# Patient Record
Sex: FEMALE | Race: OTHER | HISPANIC OR LATINO
[De-identification: names, ages, dates, MRNs, and addresses within clinical notes are randomized per-mention and may not be internally consistent; named-entity substitution may affect disease eponyms.]

---

## 2018-08-21 ENCOUNTER — RESULT REVIEW (OUTPATIENT)
Age: 73
End: 2018-08-21

## 2019-07-27 ENCOUNTER — RESULT REVIEW (OUTPATIENT)
Age: 74
End: 2019-07-27

## 2019-08-03 ENCOUNTER — EMERGENCY (EMERGENCY)
Facility: HOSPITAL | Age: 74
LOS: 1 days | Discharge: HOME | End: 2019-08-03
Attending: EMERGENCY MEDICINE
Payer: MEDICAID

## 2019-08-03 VITALS
RESPIRATION RATE: 20 BRPM | SYSTOLIC BLOOD PRESSURE: 137 MMHG | OXYGEN SATURATION: 98 % | HEART RATE: 120 BPM | DIASTOLIC BLOOD PRESSURE: 90 MMHG | TEMPERATURE: 98 F | WEIGHT: 132.06 LBS

## 2019-08-03 VITALS — HEART RATE: 101 BPM | DIASTOLIC BLOOD PRESSURE: 77 MMHG | OXYGEN SATURATION: 99 % | SYSTOLIC BLOOD PRESSURE: 138 MMHG

## 2019-08-03 DIAGNOSIS — Z96.1 PRESENCE OF INTRAOCULAR LENS: Chronic | ICD-10-CM

## 2019-08-03 LAB
ALBUMIN SERPL ELPH-MCNC: 4.4 G/DL — SIGNIFICANT CHANGE UP (ref 3.3–5.2)
ALP SERPL-CCNC: 173 U/L — HIGH (ref 40–120)
ALT FLD-CCNC: 20 U/L — SIGNIFICANT CHANGE UP
ANION GAP SERPL CALC-SCNC: 11 MMOL/L — SIGNIFICANT CHANGE UP (ref 5–17)
APTT BLD: 31.2 SEC — SIGNIFICANT CHANGE UP (ref 27.5–36.3)
AST SERPL-CCNC: 22 U/L — SIGNIFICANT CHANGE UP
BASOPHILS # BLD AUTO: 0.03 K/UL — SIGNIFICANT CHANGE UP (ref 0–0.2)
BASOPHILS NFR BLD AUTO: 0.4 % — SIGNIFICANT CHANGE UP (ref 0–2)
BILIRUB SERPL-MCNC: 0.6 MG/DL — SIGNIFICANT CHANGE UP (ref 0.4–2)
BUN SERPL-MCNC: 10 MG/DL — SIGNIFICANT CHANGE UP (ref 8–20)
CALCIUM SERPL-MCNC: 9.6 MG/DL — SIGNIFICANT CHANGE UP (ref 8.6–10.2)
CHLORIDE SERPL-SCNC: 99 MMOL/L — SIGNIFICANT CHANGE UP (ref 98–107)
CK SERPL-CCNC: 108 U/L — SIGNIFICANT CHANGE UP (ref 25–170)
CO2 SERPL-SCNC: 27 MMOL/L — SIGNIFICANT CHANGE UP (ref 22–29)
CREAT SERPL-MCNC: 0.49 MG/DL — LOW (ref 0.5–1.3)
EOSINOPHIL # BLD AUTO: 0.04 K/UL — SIGNIFICANT CHANGE UP (ref 0–0.5)
EOSINOPHIL NFR BLD AUTO: 0.5 % — SIGNIFICANT CHANGE UP (ref 0–6)
GLUCOSE SERPL-MCNC: 122 MG/DL — HIGH (ref 70–115)
HCT VFR BLD CALC: 41.9 % — SIGNIFICANT CHANGE UP (ref 34.5–45)
HGB BLD-MCNC: 13.6 G/DL — SIGNIFICANT CHANGE UP (ref 11.5–15.5)
IMM GRANULOCYTES NFR BLD AUTO: 0.3 % — SIGNIFICANT CHANGE UP (ref 0–1.5)
INR BLD: 1.03 RATIO — SIGNIFICANT CHANGE UP (ref 0.88–1.16)
LYMPHOCYTES # BLD AUTO: 0.98 K/UL — LOW (ref 1–3.3)
LYMPHOCYTES # BLD AUTO: 13.4 % — SIGNIFICANT CHANGE UP (ref 13–44)
MCHC RBC-ENTMCNC: 26.6 PG — LOW (ref 27–34)
MCHC RBC-ENTMCNC: 32.5 GM/DL — SIGNIFICANT CHANGE UP (ref 32–36)
MCV RBC AUTO: 82 FL — SIGNIFICANT CHANGE UP (ref 80–100)
MONOCYTES # BLD AUTO: 0.48 K/UL — SIGNIFICANT CHANGE UP (ref 0–0.9)
MONOCYTES NFR BLD AUTO: 6.6 % — SIGNIFICANT CHANGE UP (ref 2–14)
NEUTROPHILS # BLD AUTO: 5.76 K/UL — SIGNIFICANT CHANGE UP (ref 1.8–7.4)
NEUTROPHILS NFR BLD AUTO: 78.8 % — HIGH (ref 43–77)
NT-PROBNP SERPL-SCNC: 110 PG/ML — SIGNIFICANT CHANGE UP (ref 0–300)
PLATELET # BLD AUTO: 342 K/UL — SIGNIFICANT CHANGE UP (ref 150–400)
POTASSIUM SERPL-MCNC: 3.3 MMOL/L — LOW (ref 3.5–5.3)
POTASSIUM SERPL-SCNC: 3.3 MMOL/L — LOW (ref 3.5–5.3)
PROT SERPL-MCNC: 7.2 G/DL — SIGNIFICANT CHANGE UP (ref 6.6–8.7)
PROTHROM AB SERPL-ACNC: 11.8 SEC — SIGNIFICANT CHANGE UP (ref 10–12.9)
RBC # BLD: 5.11 M/UL — SIGNIFICANT CHANGE UP (ref 3.8–5.2)
RBC # FLD: 14 % — SIGNIFICANT CHANGE UP (ref 10.3–14.5)
SODIUM SERPL-SCNC: 137 MMOL/L — SIGNIFICANT CHANGE UP (ref 135–145)
TROPONIN T SERPL-MCNC: <0.01 NG/ML — SIGNIFICANT CHANGE UP (ref 0–0.06)
TROPONIN T SERPL-MCNC: <0.01 NG/ML — SIGNIFICANT CHANGE UP (ref 0–0.06)
WBC # BLD: 7.31 K/UL — SIGNIFICANT CHANGE UP (ref 3.8–10.5)
WBC # FLD AUTO: 7.31 K/UL — SIGNIFICANT CHANGE UP (ref 3.8–10.5)

## 2019-08-03 PROCEDURE — 93005 ELECTROCARDIOGRAM TRACING: CPT

## 2019-08-03 PROCEDURE — 82550 ASSAY OF CK (CPK): CPT

## 2019-08-03 PROCEDURE — 70450 CT HEAD/BRAIN W/O DYE: CPT | Mod: 26

## 2019-08-03 PROCEDURE — 70551 MRI BRAIN STEM W/O DYE: CPT

## 2019-08-03 PROCEDURE — 70551 MRI BRAIN STEM W/O DYE: CPT | Mod: 26

## 2019-08-03 PROCEDURE — 80053 COMPREHEN METABOLIC PANEL: CPT

## 2019-08-03 PROCEDURE — 85027 COMPLETE CBC AUTOMATED: CPT

## 2019-08-03 PROCEDURE — 83880 ASSAY OF NATRIURETIC PEPTIDE: CPT

## 2019-08-03 PROCEDURE — 71046 X-RAY EXAM CHEST 2 VIEWS: CPT | Mod: 26

## 2019-08-03 PROCEDURE — 96374 THER/PROPH/DIAG INJ IV PUSH: CPT

## 2019-08-03 PROCEDURE — 71046 X-RAY EXAM CHEST 2 VIEWS: CPT

## 2019-08-03 PROCEDURE — 85730 THROMBOPLASTIN TIME PARTIAL: CPT

## 2019-08-03 PROCEDURE — 70544 MR ANGIOGRAPHY HEAD W/O DYE: CPT

## 2019-08-03 PROCEDURE — T1013: CPT

## 2019-08-03 PROCEDURE — 70450 CT HEAD/BRAIN W/O DYE: CPT

## 2019-08-03 PROCEDURE — G0378: CPT

## 2019-08-03 PROCEDURE — 84484 ASSAY OF TROPONIN QUANT: CPT

## 2019-08-03 PROCEDURE — 85610 PROTHROMBIN TIME: CPT

## 2019-08-03 PROCEDURE — 70547 MR ANGIOGRAPHY NECK W/O DYE: CPT | Mod: 26

## 2019-08-03 PROCEDURE — 99284 EMERGENCY DEPT VISIT MOD MDM: CPT | Mod: 25

## 2019-08-03 PROCEDURE — 36415 COLL VENOUS BLD VENIPUNCTURE: CPT

## 2019-08-03 PROCEDURE — 93010 ELECTROCARDIOGRAM REPORT: CPT | Mod: 76

## 2019-08-03 PROCEDURE — 70544 MR ANGIOGRAPHY HEAD W/O DYE: CPT | Mod: 26,59

## 2019-08-03 PROCEDURE — 99218: CPT

## 2019-08-03 PROCEDURE — 70547 MR ANGIOGRAPHY NECK W/O DYE: CPT

## 2019-08-03 RX ORDER — AMLODIPINE BESYLATE 2.5 MG/1
5 TABLET ORAL DAILY
Refills: 0 | Status: DISCONTINUED | OUTPATIENT
Start: 2019-08-03 | End: 2019-08-09

## 2019-08-03 RX ORDER — CAPTOPRIL 12.5 MG/1
25 TABLET ORAL AT BEDTIME
Refills: 0 | Status: DISCONTINUED | OUTPATIENT
Start: 2019-08-03 | End: 2019-08-09

## 2019-08-03 RX ORDER — OMEPRAZOLE 10 MG/1
1 CAPSULE, DELAYED RELEASE ORAL
Qty: 0 | Refills: 0 | DISCHARGE

## 2019-08-03 RX ORDER — PANTOPRAZOLE SODIUM 20 MG/1
40 TABLET, DELAYED RELEASE ORAL
Refills: 0 | Status: DISCONTINUED | OUTPATIENT
Start: 2019-08-03 | End: 2019-08-09

## 2019-08-03 RX ORDER — POTASSIUM CHLORIDE 20 MEQ
40 PACKET (EA) ORAL ONCE
Refills: 0 | Status: COMPLETED | OUTPATIENT
Start: 2019-08-03 | End: 2019-08-03

## 2019-08-03 RX ORDER — CAPTOPRIL 12.5 MG/1
1 TABLET ORAL
Qty: 0 | Refills: 0 | DISCHARGE

## 2019-08-03 RX ORDER — ASPIRIN/CALCIUM CARB/MAGNESIUM 324 MG
81 TABLET ORAL DAILY
Refills: 0 | Status: DISCONTINUED | OUTPATIENT
Start: 2019-08-03 | End: 2019-08-09

## 2019-08-03 RX ORDER — MECLIZINE HCL 12.5 MG
25 TABLET ORAL ONCE
Refills: 0 | Status: COMPLETED | OUTPATIENT
Start: 2019-08-03 | End: 2019-08-03

## 2019-08-03 RX ORDER — SODIUM CHLORIDE 9 MG/ML
2000 INJECTION INTRAMUSCULAR; INTRAVENOUS; SUBCUTANEOUS ONCE
Refills: 0 | Status: COMPLETED | OUTPATIENT
Start: 2019-08-03 | End: 2019-08-03

## 2019-08-03 RX ORDER — AMLODIPINE BESYLATE 2.5 MG/1
1 TABLET ORAL
Qty: 0 | Refills: 0 | DISCHARGE

## 2019-08-03 RX ORDER — ONDANSETRON 8 MG/1
4 TABLET, FILM COATED ORAL ONCE
Refills: 0 | Status: COMPLETED | OUTPATIENT
Start: 2019-08-03 | End: 2019-08-03

## 2019-08-03 RX ORDER — ASPIRIN/CALCIUM CARB/MAGNESIUM 324 MG
1 TABLET ORAL
Qty: 0 | Refills: 0 | DISCHARGE

## 2019-08-03 RX ORDER — HYDROCHLOROTHIAZIDE 25 MG
25 TABLET ORAL DAILY
Refills: 0 | Status: DISCONTINUED | OUTPATIENT
Start: 2019-08-03 | End: 2019-08-09

## 2019-08-03 RX ADMIN — Medication 25 MILLIGRAM(S): at 16:15

## 2019-08-03 RX ADMIN — PANTOPRAZOLE SODIUM 40 MILLIGRAM(S): 20 TABLET, DELAYED RELEASE ORAL at 16:15

## 2019-08-03 RX ADMIN — Medication 40 MILLIEQUIVALENT(S): at 17:46

## 2019-08-03 RX ADMIN — SODIUM CHLORIDE 1000 MILLILITER(S): 9 INJECTION INTRAMUSCULAR; INTRAVENOUS; SUBCUTANEOUS at 18:42

## 2019-08-03 RX ADMIN — Medication 81 MILLIGRAM(S): at 16:15

## 2019-08-03 RX ADMIN — AMLODIPINE BESYLATE 5 MILLIGRAM(S): 2.5 TABLET ORAL at 16:15

## 2019-08-03 RX ADMIN — Medication 25 MILLIGRAM(S): at 12:14

## 2019-08-03 RX ADMIN — ONDANSETRON 4 MILLIGRAM(S): 8 TABLET, FILM COATED ORAL at 10:56

## 2019-08-03 NOTE — ED ADULT NURSE REASSESSMENT NOTE - NS ED NURSE REASSESS COMMENT FT1
received pt from previous Rn Manuela Akins. pt predominately [place]ian speaking. ED  offered, family decline. pt c/o in-going palpations. pt states" I fewel my heart racing." pt HR-80 on cardiac monitor. DANIEL Westbrook made aware and per PA, pt cleared by cardiology  and will be dc'd today. family updated on plan of care. pt transported off unit to x-ray. resting in stretcher, no apparent distress noted. bed in lowest position, and  safety maintained.

## 2019-08-03 NOTE — ED CDU PROVIDER INITIAL DAY NOTE - ATTENDING CONTRIBUTION TO CARE
I was available for the PA.  Patient placed in observation for dizzines and found to be tachycardic.  consultation appreciated.  will monitor and continue to treat while obtaining further diagnostic testing and treatment.  Uneventful ED observation period. Non toxic.  Well appearing.

## 2019-08-03 NOTE — ED PROVIDER NOTE - OBJECTIVE STATEMENT
73 y/o F with PMHx of HTN comes to [facility] ED c/o of Dizziness and sharp CP in the AM. For her dizziness she reports having it since yesterday after waking up. She feels the room spinning after she starts moving and looking down. Reports mild decrease of hearing on left side. Associated nausea, mild epigastric burning, but no vomits.  Also reported having for the past month increasing exhaustion when walking with occasional associated SOB and CP progressing for about a month. CP is described as multiple sharp stabbings lasting a few seconds to minutes over left thoracic wall, increased with movement and relieved with local massage. Sometimes feels random palpitations and has mild swelling in feet when she misses her HCTZ dose.    Denies having tinnitus, fever, chills, malaise, sneezing, coughing, mucous in nares, sore throat, falls or previous episodes.

## 2019-08-03 NOTE — ED CDU PROVIDER INITIAL DAY NOTE - NS ED ROS FT
General: no fever/chills  HEENT: no difficulty swallowing  Respiratory: no shortness of breath or cough  Cardiac: no chest pain or palpitations  Abdomen: no abdominal pain or vomiting  Musculoskeletal: no neck pain or back pain  Neuro: + dizziness  Skin: no lesions or rashes

## 2019-08-03 NOTE — ED CDU PROVIDER INITIAL DAY NOTE - MEDICAL DECISION MAKING DETAILS
will place in observation for MRI brain, MRA head/neck, carotid US will place in observation for MRI brain, MRA head/neck, carotid US   pt found to have HR spiking into 120s with PVCs - TTE, bnp, trops, SS cardiology consult

## 2019-08-03 NOTE — ED CDU PROVIDER DISPOSITION NOTE - CLINICAL COURSE
pt c/o dizziness - head CT, MRI head, MRA head/neck, labs unremarkable - f/u with neuro as outpatient stroke workup negative - pt's HR intermittently in 120s with PVCs - TTE and SS cardiology consult stroke workup negative - pt's HR intermittently in 120s with PVCs, resolved after 2L NS. Pt evaluated by Dr. Alcantara, Cardiology. Stable for dc at this time. Pt encouraged to f/u with cardiology within 2 weeks.

## 2019-08-03 NOTE — ED CDU PROVIDER INITIAL DAY NOTE - OBJECTIVE STATEMENT
75 y/o F c/o dizziness onset this morning.  Patient is having difficulty maintaining balance while walking.

## 2019-08-03 NOTE — ED ADULT NURSE REASSESSMENT NOTE - NSIMPLEMENTINTERV_GEN_ALL_ED
Implemented All Fall Risk Interventions:  [place] to call system. Call bell, personal items and telephone within reach. Instruct patient to call for assistance. Room bathroom lighting operational. Non-slip footwear when patient is off stretcher. Physically safe environment: no spills, clutter or unnecessary equipment. Stretcher in lowest position, wheels locked, appropriate side rails in place. Provide visual cue, wrist band, yellow gown, etc. Monitor gait and stability. Monitor for mental status changes and reorient to person, place, and time. Review medications for side effects contributing to fall risk. Reinforce activity limits and safety measures with patient and family.

## 2019-08-03 NOTE — ED PROVIDER NOTE - CLINICAL SUMMARY MEDICAL DECISION MAKING FREE TEXT BOX
74 year old c/o dizziness noted to have unsteady gait.  CT negative patient placed on observation rule out cerebellar CVA.

## 2019-08-03 NOTE — ED STATDOCS - NS_ ATTENDINGSCRIBEDETAILS _ED_A_ED_FT
I, Bridgette Baer, performed the initial face to face bedside interview with this patient regarding history of present illness, review of symptoms and relevant past medical, social and family history.  I completed an independent physical examination.  The history, relevant review of systems, past medical and surgical history, medical decision making, and physical examination was documented by the scribe in my presence and I attest to the accuracy of the documentation.

## 2019-08-03 NOTE — ED PROVIDER NOTE - ATTENDING CONTRIBUTION TO CARE
74 year old hx of HTN c/o dizziness with unsteady gait.  Patient well appearing CN 2-12 intact however patient noted to have unsteady gait in the ER with persistent dizziness.  Vitals stable. CT negative for acute pathology.  Patient placed on observation for MRI to rule out cerebellar lesion vs infarct.

## 2019-08-03 NOTE — ED PROVIDER NOTE - PROGRESS NOTE DETAILS
Dr. Keys:  Patient reporting minimal improvement after meclizine patient noted to have unsteady gait.

## 2019-08-03 NOTE — ED ADULT NURSE NOTE - OBJECTIVE STATEMENT
Pt care assumed at 1045, presents to ED A&Ox3 c/o headache and dizziness with associated nausea since yesterday, states worse with position change. Pt denies any vision changes, weakness, facial droop or slurred speech. Pt also reports some pleuritic chest pain, worse when she ambulates and deep inspiration. Denies any SOB, respirations even and unlabored. Pt sinus tach on cardiac monitor, . MD at bedside and aware. Pt denies any fever, chills or sick contacts. Moving all extremities with strength and purpose. IV access established, labs drawn and sent, results pending. Will continue to monitor and reassess.

## 2019-08-03 NOTE — ED ADULT TRIAGE NOTE - CHIEF COMPLAINT QUOTE
im feeling dizzy and Ha since yesterday, reports blurry vision, reports inability to walk with steady gait, reports hx HTN,  HA worsens with movement

## 2019-08-03 NOTE — ED ADULT NURSE NOTE - NSIMPLEMENTINTERV_GEN_ALL_ED
Implemented All Universal Safety Interventions:  [place] to call system. Call bell, personal items and telephone within reach. Instruct patient to call for assistance. Room bathroom lighting operational. Non-slip footwear when patient is off stretcher. Physically safe environment: no spills, clutter or unnecessary equipment. Stretcher in lowest position, wheels locked, appropriate side rails in place.

## 2019-08-03 NOTE — ED CDU PROVIDER DISPOSITION NOTE - ATTENDING CONTRIBUTION TO CARE
here for dizziness. Work up neg. had episode tachycardia that resolved with iv fluifds  agree with care  I, Mary Palmer, performed the initial face to face bedside interview with this patient regarding history of present illness, review of symptoms and relevant past medical, social and family history.  I completed an independent physical examination.  I was the initial provider who evaluated this patient. I have signed out the follow up of any pending tests (i.e. labs, radiological studies) to the ACP.  I have communicated the patient’s plan of care and disposition with the ACP.

## 2019-08-03 NOTE — ED PROVIDER NOTE - PHYSICAL EXAMINATION
Const: Awake, alert and oriented x3. Uncomfortable, dizzy appearance. Sitting in bed and holding to rails.   HEENT: NC/AT, PERRLA, EOMI, lateral nystagmus elicited on eye movements, more prominent on right. Left tympanic membrane is opaque with evidence of clear liquid/bubbles behing left TM. Right is normal. Non tender sinuses. Clear nares, pink turbinates. Moist mucous membranes, mild erythema in oropharynx 1/4. No lesions, secretions.   Neck:. Soft and supple. Full ROM without pain. Hallpike testing elicits dizziness.   Cardiovascular: Regular rate and regular rhythm. +S1/S2. No murmurs. Peripheral pulses 2+ and symmetric. No LE edema.  Respiratory: Speaking in full sentences. No evidence of respiratory distress. CTAB. No wheezes, crackles, rales or rhonchi.  Abd: Normal bowel sounds in all 4 quadrants, Soft, non-distended. non-tender. No guarding or rebound. Negative Saeed, McBurney, Rovsing.   Neuro: Awake, alert & oriented x 3, CN II-XII grossly intact, Strength 5/5 in all extremities, Sensation preserved in all extremities. FTN & Heel to shin WNL. Romberg positive with leaning forward when eyes closed with outstretching arms. Widened gait when walking.

## 2019-08-03 NOTE — ED PROVIDER NOTE - NS ED ROS FT
Const: Denies fever, chills, malaise, fatigue, night sweats  HEENT: Has , mild decrease in left hearing. Denies changes in vision, sore throat, cough, sneezing, mucous secretions  Neck: Denies neck pain/stiffness  Resp: Denies coughing, sneezing, SOB  Cardiovascular: Has occasional CP, palpitations, LE edema  GI: Denies nausea, vomiting, abdominal pain, diarrhea, constipation, blood in stool  : Denies urinary frequency/urgency/dysuria, hematuria  MSK: Denies back pain  Neuro: Has dizziness increased on movement Denies HA, numbness, focal weaknesses, LOC  Skin: Denies rashes

## 2019-08-03 NOTE — ED CDU PROVIDER INITIAL DAY NOTE - PROGRESS NOTE DETAILS
pt still lightheaded - tachy into 120s on monitor with PVCs discussed case with Dr. Alcantara who advised to give 2 L bolus and reassess

## 2019-08-03 NOTE — ED STATDOCS - PROGRESS NOTE DETAILS
75 y/o F pt with no significant PMHx presents to the ED c/o CP and dizziness onset yesterday. Associated sx of nausea and tachycardia. She reports that she has been feeling dizzy since last night, and woke up this morning still dizzy. Dizziness lasted more than 12 hours.   CN II-XII intact, 5/5 global strength, sensation intact, (+) LUE ataxia. no dysmetria, gait intact.   LBBB at 115 BPM. No sgarbossa's criteria.   Focused eval, protocol orders entered. Pt to be moved to main ED for complete evaluation by another provider. 75 y/o F pt with no significant PMHx presents to the ED c/o CP and dizziness onset yesterday. Associated sx of nausea and tachycardia. She reports that she has been feeling dizzy since last night, and woke up this morning still dizzy. Dizziness lasted more than 12 hours.   CN II-XII intact, 5/5 global strength, sensation intact, (+) LUE ataxia. no dysmetria, gait intact.   LBBB at 115 BPM. No sgarbossa's criteria. no previous EKG in system  Focused eval, protocol orders entered. Pt to be moved to main ED for complete evaluation by another provider.

## 2020-11-28 ENCOUNTER — RESULT REVIEW (OUTPATIENT)
Age: 75
End: 2020-11-28

## 2020-12-01 ENCOUNTER — OUTPATIENT (OUTPATIENT)
Dept: OUTPATIENT SERVICES | Facility: HOSPITAL | Age: 75
LOS: 1 days | End: 2020-12-01
Payer: MEDICARE

## 2020-12-01 DIAGNOSIS — Z11.59 ENCOUNTER FOR SCREENING FOR OTHER VIRAL DISEASES: ICD-10-CM

## 2020-12-01 DIAGNOSIS — Z96.1 PRESENCE OF INTRAOCULAR LENS: Chronic | ICD-10-CM

## 2020-12-01 PROBLEM — I10 ESSENTIAL (PRIMARY) HYPERTENSION: Chronic | Status: ACTIVE | Noted: 2019-08-03

## 2020-12-01 PROBLEM — H11.003 UNSPECIFIED PTERYGIUM OF EYE, BILATERAL: Chronic | Status: ACTIVE | Noted: 2019-08-03

## 2020-12-01 LAB — SARS-COV-2 RNA SPEC QL NAA+PROBE: SIGNIFICANT CHANGE UP

## 2020-12-01 PROCEDURE — U0003: CPT

## 2020-12-02 DIAGNOSIS — Z11.59 ENCOUNTER FOR SCREENING FOR OTHER VIRAL DISEASES: ICD-10-CM

## 2022-11-28 PROBLEM — Z00.00 ENCOUNTER FOR PREVENTIVE HEALTH EXAMINATION: Status: ACTIVE | Noted: 2022-11-28

## 2022-11-30 ENCOUNTER — APPOINTMENT (OUTPATIENT)
Dept: ORTHOPEDIC SURGERY | Facility: CLINIC | Age: 77
End: 2022-11-30
Payer: MEDICARE

## 2022-11-30 VITALS
HEART RATE: 99 BPM | HEIGHT: 57 IN | BODY MASS INDEX: 28.05 KG/M2 | SYSTOLIC BLOOD PRESSURE: 196 MMHG | DIASTOLIC BLOOD PRESSURE: 118 MMHG | WEIGHT: 130 LBS | TEMPERATURE: 97.9 F

## 2022-11-30 DIAGNOSIS — M79.645 PAIN IN LEFT FINGER(S): ICD-10-CM

## 2022-11-30 PROCEDURE — 99204 OFFICE O/P NEW MOD 45 MIN: CPT | Mod: 25

## 2022-11-30 PROCEDURE — 20550 NJX 1 TENDON SHEATH/LIGAMENT: CPT | Mod: LT

## 2022-11-30 PROCEDURE — 73110 X-RAY EXAM OF WRIST: CPT | Mod: 50

## 2022-11-30 NOTE — CONSULT LETTER
[Dear  ___] : Dear ~ALEJANDRO, [Consult Letter:] : I had the pleasure of evaluating your patient, [unfilled]. [Please see my note below.] : Please see my note below. [Consult Closing:] : Thank you very much for allowing me to participate in the care of this patient.  If you have any questions, please do not hesitate to contact me. [Sincerely,] : Sincerely, [FreeTextEntry2] : Michelle Mancilla, MSN\par [address] [facility]\[place] [address] [FreeTextEntry3] : Marky Balderas MD

## 2022-11-30 NOTE — PHYSICAL EXAM
[de-identified] : General Exam:\par \par [The patient is alert and oriented, is well appearing, and in no apparent distress]\par [Cervical spine mobility is full in all directions]\par [There are no apparent skin lesions, ulcers, or masses]\par [Inspection of the extremities shows no signs of skin changes or muscle asymmetry]\par \par Examination of bilateral wrist reveals significant tenderness with carpal tunnel compression bilaterally eliciting numbness and tingling throughout the median nerve distribution.  She has loss of light touch bilaterally at the fingertips\par \par Examination of the left thumb reveals significant pain with compression of the A1 pulley eliciting triggering on examination\par  [de-identified] : [4] views of bilateral hands and wrists were obtained today in my office and were seen by me and discussed with the patient. \par These show findings consistent with grossly preserved radiocarpal joint spaces and mild ulnar positivity on the right.  She has multiple IP joint arthritis\par \par \par

## 2022-11-30 NOTE — ASSESSMENT
[FreeTextEntry1] : ASSESSMENT:\par \par [She was accompanied today and was assisted with explaining their complaints today.]\par The patient comes in today with chronic bilateral wrist pain with carpal tunnel like symptoms including numbness tingling and nighttime symptomatology.  She also has significant left thumb pain in the form of tendinitis and triggering\par [I have diagnosed the patient today with a new diagnosis.]\par [This is likely to diminish bodily function for the extremity.] \par \par [The patient was given an injection today.]\par Injection:\par \par The risks and benefits of a steroid injection were discussed in detail. The risks include but are not limited to: pain, infection, swelling, flare response, bleeding, subcutaneous fat atrophy, skin depigmentation and/or elevation of blood sugar. The risk of incomplete resolution of symptoms, recurrence and additional intervention was reviewed and considered by the patient. \par The patient agreed to proceed and under a sterile prep, I injected 2 cc of a combination of Celestone and Lidocaine into the left thumb A1 pulley. The patient tolerated the injection well.\par \par She was adequately and thoroughly informed of my assessment of their current condition(s). \par \par \par \par

## 2022-11-30 NOTE — HISTORY OF PRESENT ILLNESS
[FreeTextEntry1] : Bridgette is a pleasant 77-year-old female who is here with her daughter today.  She tells me that for greater than a year now she has been suffering with bilateral wrist pain and significant numbness and tingling that wakes her up at night on a daily basis.  She also complains of significant left thumb pain and episodes of swelling stiffness and triggering.

## 2022-11-30 NOTE — REASON FOR VISIT
[Initial Visit] : an initial visit for [Family Member] : family member [FreeTextEntry2] : left hand pain

## 2022-11-30 NOTE — DISCUSSION/SUMMARY
[FreeTextEntry1] : 1.  I am hopeful that the injection to the left thumb will help alleviate her triggering.\par \par #2 she has elected to wear bilateral wrist braces that should help with her carpal tunnel like symptoms\par \par #3 I would like to see her back when she gets the results of the EMG nerve study

## 2022-12-05 ENCOUNTER — APPOINTMENT (OUTPATIENT)
Dept: PHYSICAL MEDICINE AND REHAB | Facility: CLINIC | Age: 77
End: 2022-12-05
Payer: MEDICARE

## 2022-12-05 PROCEDURE — 95909 NRV CNDJ TST 5-6 STUDIES: CPT

## 2022-12-05 NOTE — PROCEDURE
[de-identified] : PRE-PROCEDURE\par \par * Was H&P completed and in chart at time of procedure ?  YES\par * Were the indications for the procedure appropriate ?  YES\par * Were the practitioner's entries in the patient's medical record appropriate ?  YES\par \par PROCEDURE\par * Did the practitioner maintain proper sterile technique ?  YES\par * If bleeding was encountered, did the practitioner manage it appropriately?  YES\par * Were complications, if any, recognized and managed appropriately?  YES\par * Was the practitioner's use of diagnostic services (e.g., lab, x-ray, MRI, CT) appropriate?  YES\par \par POST-PROCEDURE\par * Did the pre-procedure diagnosis coincide with the findings of the procedure?  YES\par * Was the procedure report complete, accurate and timely?  YES\par

## 2022-12-05 NOTE — ASSESSMENT
[FreeTextEntry1] : NCS B/L UE performed at today's office visit.  Results consistent with 1 mild, demyelinating, sensory, median mononeuropathy across both wrists, slightly worse on the left; 2 mild, demyelinating, motor, ulnar mononeuropathy across the left elbow.  Full report to follow

## 2023-01-11 ENCOUNTER — APPOINTMENT (OUTPATIENT)
Dept: ORTHOPEDIC SURGERY | Facility: CLINIC | Age: 78
End: 2023-01-11
Payer: MEDICARE

## 2023-01-11 VITALS
TEMPERATURE: 97.1 F | BODY MASS INDEX: 26.1 KG/M2 | WEIGHT: 116 LBS | DIASTOLIC BLOOD PRESSURE: 112 MMHG | HEIGHT: 56 IN | HEART RATE: 112 BPM | SYSTOLIC BLOOD PRESSURE: 190 MMHG

## 2023-01-11 DIAGNOSIS — M25.532 PAIN IN LEFT WRIST: ICD-10-CM

## 2023-01-11 DIAGNOSIS — M25.512 PAIN IN LEFT SHOULDER: ICD-10-CM

## 2023-01-11 DIAGNOSIS — M25.531 PAIN IN RIGHT WRIST: ICD-10-CM

## 2023-01-11 PROCEDURE — 20610 DRAIN/INJ JOINT/BURSA W/O US: CPT | Mod: LT

## 2023-01-11 PROCEDURE — 99214 OFFICE O/P EST MOD 30 MIN: CPT | Mod: 25

## 2023-01-11 NOTE — HISTORY OF PRESENT ILLNESS
[FreeTextEntry1] : Marce returns for follow-up today with her daughter.  At this point in time she is complaining of worsening left greater than right hand numbness and tingling with discomfort at the wrists.  She is considering surgical management for this condition\par \par She also complains of worsening left shoulder pain atraumatic in nature.  This wakes her up at night and she has trouble with activities of the living in general.

## 2023-01-11 NOTE — DISCUSSION/SUMMARY
[FreeTextEntry1] : 1.  She has elected to proceed with left-sided carpal tunnel release\par \par #2 I am hopeful that the injection to the left shoulder will help improve her impingement tendinosis and inflammation\par #3 I would like for her to commence physical therapy which should be of benefit for her left shoulder\par

## 2023-01-11 NOTE — REASON FOR VISIT
[Follow-Up Visit] : a follow-up visit for [Family Member] : family member [FreeTextEntry2] : left arm pain and numbness. Here for EMG results.

## 2023-01-11 NOTE — PHYSICAL EXAM
[de-identified] : General Exam:\par She is well-appearing on examination\par \par Examination of the left shoulder reveals equaling passive and active motion as well as compared to the contralateral side\par She has positive Hart testing on the left.  She has pain and weakness with Ratna and Speed test with tenderness at the bicipital groove.\par \par Examination of bilateral wrist reveals significant tenderness with carpal tunnel compression bilaterally eliciting numbness and tingling throughout the median nerve distribution.  She has loss of light touch bilaterally at the fingertips\par \par

## 2023-05-10 ENCOUNTER — RX ONLY (RX ONLY)
Age: 78
End: 2023-05-10

## 2023-05-10 ENCOUNTER — OFFICE (OUTPATIENT)
Dept: URBAN - METROPOLITAN AREA CLINIC 115 | Facility: CLINIC | Age: 78
Setting detail: OPHTHALMOLOGY
End: 2023-05-10
Payer: COMMERCIAL

## 2023-05-10 DIAGNOSIS — H25.12: ICD-10-CM

## 2023-05-10 DIAGNOSIS — H11.043: ICD-10-CM

## 2023-05-10 PROCEDURE — 92002 INTRM OPH EXAM NEW PATIENT: CPT | Performed by: OPTOMETRIST

## 2023-05-10 ASSESSMENT — CORNEAL PTERYGIUM
OS_PTERYGIUM: NASAL 3MM
OD_PTERYGIUM: NASAL 3MM

## 2023-05-10 ASSESSMENT — CONFRONTATIONAL VISUAL FIELD TEST (CVF)
OS_FINDINGS: FULL
OD_FINDINGS: FULL

## 2023-05-10 ASSESSMENT — VISUAL ACUITY
OD_BCVA: 20/40
OS_BCVA: 20/300

## 2023-05-31 ENCOUNTER — OFFICE (OUTPATIENT)
Dept: URBAN - METROPOLITAN AREA CLINIC 6 | Facility: CLINIC | Age: 78
Setting detail: OPHTHALMOLOGY
End: 2023-05-31
Payer: COMMERCIAL

## 2023-05-31 DIAGNOSIS — H01.005: ICD-10-CM

## 2023-05-31 DIAGNOSIS — H01.001: ICD-10-CM

## 2023-05-31 DIAGNOSIS — H01.002: ICD-10-CM

## 2023-05-31 DIAGNOSIS — Z96.1: ICD-10-CM

## 2023-05-31 DIAGNOSIS — H01.004: ICD-10-CM

## 2023-05-31 DIAGNOSIS — H25.12: ICD-10-CM

## 2023-05-31 DIAGNOSIS — H11.043: ICD-10-CM

## 2023-05-31 PROCEDURE — 99214 OFFICE O/P EST MOD 30 MIN: CPT | Performed by: OPHTHALMOLOGY

## 2023-05-31 PROCEDURE — 92285 EXTERNAL OCULAR PHOTOGRAPHY: CPT | Performed by: OPHTHALMOLOGY

## 2023-05-31 PROCEDURE — 92025 CPTRIZED CORNEAL TOPOGRAPHY: CPT | Performed by: OPHTHALMOLOGY

## 2023-05-31 ASSESSMENT — REFRACTION_MANIFEST
OS_SPHERE: -1.50
OS_CYLINDER: -1.00
OD_CYLINDER: -1.00
OS_VA1: 20/60-1
OD_AXIS: 125
OS_AXIS: 105
OD_SPHERE: -1.75
OD_VA1: 20/30-2

## 2023-05-31 ASSESSMENT — SPHEQUIV_DERIVED
OS_SPHEQUIV: -2
OD_SPHEQUIV: -2.25
OS_SPHEQUIV: -2
OD_SPHEQUIV: -2.25

## 2023-05-31 ASSESSMENT — REFRACTION_AUTOREFRACTION
OD_CYLINDER: -1.00
OD_AXIS: 125
OS_SPHERE: -1.50
OD_SPHERE: -1.75
OS_CYLINDER: -1.00
OS_AXIS: 105

## 2023-05-31 ASSESSMENT — TONOMETRY
OD_IOP_MMHG: 16
OS_IOP_MMHG: 18

## 2023-05-31 ASSESSMENT — CONFRONTATIONAL VISUAL FIELD TEST (CVF)
OS_FINDINGS: FULL
OD_FINDINGS: FULL

## 2023-05-31 ASSESSMENT — VISUAL ACUITY
OS_BCVA: 20/50-1
OD_BCVA: 20/100

## 2023-05-31 ASSESSMENT — LID EXAM ASSESSMENTS
OS_BLEPHARITIS: LLL LUL
OD_BLEPHARITIS: RLL RUL

## 2023-05-31 ASSESSMENT — CORNEAL PTERYGIUM
OD_PTERYGIUM: NASAL 3MM
OS_PTERYGIUM: NASAL 4MM

## 2024-01-17 ENCOUNTER — OFFICE (OUTPATIENT)
Dept: URBAN - METROPOLITAN AREA CLINIC 6 | Facility: CLINIC | Age: 79
Setting detail: OPHTHALMOLOGY
End: 2024-01-17
Payer: COMMERCIAL

## 2024-01-17 DIAGNOSIS — H01.002: ICD-10-CM

## 2024-01-17 DIAGNOSIS — H01.001: ICD-10-CM

## 2024-01-17 DIAGNOSIS — H11.043: ICD-10-CM

## 2024-01-17 DIAGNOSIS — H01.005: ICD-10-CM

## 2024-01-17 DIAGNOSIS — H25.12: ICD-10-CM

## 2024-01-17 DIAGNOSIS — H01.004: ICD-10-CM

## 2024-01-17 DIAGNOSIS — Z96.1: ICD-10-CM

## 2024-01-17 PROCEDURE — 99213 OFFICE O/P EST LOW 20 MIN: CPT | Performed by: OPHTHALMOLOGY

## 2024-01-17 PROCEDURE — 92285 EXTERNAL OCULAR PHOTOGRAPHY: CPT | Performed by: OPHTHALMOLOGY

## 2024-01-17 PROCEDURE — 92025 CPTRIZED CORNEAL TOPOGRAPHY: CPT | Performed by: OPHTHALMOLOGY

## 2024-01-17 ASSESSMENT — REFRACTION_MANIFEST
OD_CYLINDER: -1.00
OD_SPHERE: -1.75
OS_VA1: 20/60-1
OS_AXIS: 105
OD_VA1: 20/30-2
OS_SPHERE: -1.50
OD_AXIS: 125
OS_CYLINDER: -1.00

## 2024-01-17 ASSESSMENT — SPHEQUIV_DERIVED
OD_SPHEQUIV: -2.875
OD_SPHEQUIV: -2.25
OS_SPHEQUIV: -2
OS_SPHEQUIV: -2

## 2024-01-17 ASSESSMENT — REFRACTION_AUTOREFRACTION
OS_CYLINDER: -1.50
OS_AXIS: 91
OD_SPHERE: -2.50
OD_AXIS: 114
OS_SPHERE: -1.25
OD_CYLINDER: -0.75

## 2024-01-17 ASSESSMENT — CONFRONTATIONAL VISUAL FIELD TEST (CVF)
OS_FINDINGS: FULL
OD_FINDINGS: FULL

## 2024-01-17 ASSESSMENT — CORNEAL PTERYGIUM
OD_PTERYGIUM: NASAL 3MM
OS_PTERYGIUM: NASAL 4MM

## 2024-01-17 ASSESSMENT — LID EXAM ASSESSMENTS
OS_BLEPHARITIS: LLL LUL
OD_BLEPHARITIS: RLL RUL

## 2024-01-24 ENCOUNTER — ASC (OUTPATIENT)
Dept: URBAN - METROPOLITAN AREA SURGERY 8 | Facility: SURGERY | Age: 79
Setting detail: OPHTHALMOLOGY
End: 2024-01-24
Payer: COMMERCIAL

## 2024-01-24 DIAGNOSIS — H11.042: ICD-10-CM

## 2024-01-24 PROCEDURE — 65426 REMOVAL OF EYE LESION: CPT | Mod: LT | Performed by: OPHTHALMOLOGY

## 2024-01-25 ENCOUNTER — RX ONLY (RX ONLY)
Age: 79
End: 2024-01-25

## 2024-01-25 ENCOUNTER — OFFICE (OUTPATIENT)
Dept: URBAN - METROPOLITAN AREA CLINIC 115 | Facility: CLINIC | Age: 79
Setting detail: OPHTHALMOLOGY
End: 2024-01-25
Payer: COMMERCIAL

## 2024-01-25 DIAGNOSIS — H11.041: ICD-10-CM

## 2024-01-25 PROCEDURE — 99024 POSTOP FOLLOW-UP VISIT: CPT | Performed by: OPTOMETRIST

## 2024-01-25 ASSESSMENT — REFRACTION_MANIFEST
OD_VA1: 20/30-2
OS_VA1: 20/60-1
OD_SPHERE: -1.75
OD_AXIS: 125
OD_CYLINDER: -1.00
OS_AXIS: 105
OS_CYLINDER: -1.00
OS_SPHERE: -1.50

## 2024-01-25 ASSESSMENT — SPHEQUIV_DERIVED
OS_SPHEQUIV: -2
OS_SPHEQUIV: -2
OD_SPHEQUIV: -2.875
OD_SPHEQUIV: -2.25

## 2024-01-25 ASSESSMENT — LID EXAM ASSESSMENTS
OD_BLEPHARITIS: RLL RUL
OS_BLEPHARITIS: LLL LUL

## 2024-01-25 ASSESSMENT — CONFRONTATIONAL VISUAL FIELD TEST (CVF)
OD_FINDINGS: FULL
OS_FINDINGS: FULL

## 2024-01-25 ASSESSMENT — CORNEAL PTERYGIUM: OD_PTERYGIUM: NASAL 3MM

## 2024-01-25 ASSESSMENT — REFRACTION_AUTOREFRACTION
OD_AXIS: 114
OS_SPHERE: -1.25
OS_CYLINDER: -1.50
OD_SPHERE: -2.50
OD_CYLINDER: -0.75
OS_AXIS: 91

## 2024-02-02 ENCOUNTER — OFFICE (OUTPATIENT)
Dept: URBAN - METROPOLITAN AREA CLINIC 6 | Facility: CLINIC | Age: 79
Setting detail: OPHTHALMOLOGY
End: 2024-02-02
Payer: MEDICAID

## 2024-02-02 DIAGNOSIS — H11.041: ICD-10-CM

## 2024-02-02 PROCEDURE — 99024 POSTOP FOLLOW-UP VISIT: CPT | Performed by: OPHTHALMOLOGY

## 2024-02-02 ASSESSMENT — SPHEQUIV_DERIVED
OS_SPHEQUIV: -2.25
OS_SPHEQUIV: -2
OD_SPHEQUIV: -2.25
OD_SPHEQUIV: -2.625

## 2024-02-02 ASSESSMENT — REFRACTION_AUTOREFRACTION
OS_SPHERE: -1.25
OD_SPHERE: -2.25
OS_CYLINDER: -2.00
OS_AXIS: 094
OD_CYLINDER: -0.75
OD_AXIS: 102

## 2024-02-02 ASSESSMENT — CORNEAL PTERYGIUM: OD_PTERYGIUM: NASAL 3MM

## 2024-02-02 ASSESSMENT — REFRACTION_MANIFEST
OD_SPHERE: -1.75
OD_AXIS: 125
OS_SPHERE: -1.50
OS_VA1: 20/60-1
OD_VA1: 20/30-2
OS_CYLINDER: -1.00
OD_CYLINDER: -1.00
OS_AXIS: 105

## 2024-02-02 ASSESSMENT — LID EXAM ASSESSMENTS
OD_BLEPHARITIS: RLL RUL
OS_BLEPHARITIS: LLL LUL

## 2024-02-02 ASSESSMENT — CONFRONTATIONAL VISUAL FIELD TEST (CVF)
OD_FINDINGS: FULL
OS_FINDINGS: FULL

## 2024-02-22 ENCOUNTER — OFFICE (OUTPATIENT)
Dept: URBAN - METROPOLITAN AREA CLINIC 6 | Facility: CLINIC | Age: 79
Setting detail: OPHTHALMOLOGY
End: 2024-02-22
Payer: MEDICAID

## 2024-02-22 DIAGNOSIS — H11.041: ICD-10-CM

## 2024-02-22 PROBLEM — H25.13 CATARACT SENILE NUCLEAR SCLEROSIS: Status: ACTIVE | Noted: 2024-02-22

## 2024-02-22 PROBLEM — Z96.1 PSEUDOPHAKIA ; RIGHT EYE: Status: ACTIVE | Noted: 2024-02-22

## 2024-02-22 PROCEDURE — 99024 POSTOP FOLLOW-UP VISIT: CPT

## 2024-02-22 ASSESSMENT — REFRACTION_MANIFEST
OS_CYLINDER: -1.00
OD_CYLINDER: -1.00
OS_VA1: 20/60-1
OS_AXIS: 105
OD_VA1: 20/30-2
OD_AXIS: 125
OS_SPHERE: -1.50
OD_SPHERE: -1.75

## 2024-02-22 ASSESSMENT — SPHEQUIV_DERIVED
OD_SPHEQUIV: -2.25
OD_SPHEQUIV: -2.5
OS_SPHEQUIV: -2
OS_SPHEQUIV: -2.5

## 2024-02-22 ASSESSMENT — LID EXAM ASSESSMENTS
OS_BLEPHARITIS: LLL LUL
OD_BLEPHARITIS: RLL RUL

## 2024-02-22 ASSESSMENT — REFRACTION_AUTOREFRACTION
OD_CYLINDER: -1.00
OD_SPHERE: -2.00
OS_CYLINDER: -2.00
OS_SPHERE: -1.50
OD_AXIS: 128
OS_AXIS: 095

## 2024-02-22 ASSESSMENT — CONFRONTATIONAL VISUAL FIELD TEST (CVF)
OD_FINDINGS: FULL
OS_FINDINGS: FULL

## 2024-02-22 ASSESSMENT — CORNEAL PTERYGIUM: OD_PTERYGIUM: NASAL 3MM

## 2024-03-25 ENCOUNTER — OFFICE (OUTPATIENT)
Dept: URBAN - METROPOLITAN AREA CLINIC 6 | Facility: CLINIC | Age: 79
Setting detail: OPHTHALMOLOGY
End: 2024-03-25
Payer: MEDICAID

## 2024-03-25 DIAGNOSIS — H11.041: ICD-10-CM

## 2024-03-25 PROBLEM — Z96.1 PSEUDOPHAKIA ; RIGHT EYE: Status: ACTIVE | Noted: 2024-03-25

## 2024-03-25 PROCEDURE — 99024 POSTOP FOLLOW-UP VISIT: CPT

## 2024-03-25 ASSESSMENT — REFRACTION_MANIFEST
OD_CYLINDER: -1.00
OD_VA1: 20/30-2
OS_SPHERE: -1.50
OS_AXIS: 105
OS_CYLINDER: -1.00
OD_SPHERE: -1.75
OS_VA1: 20/60-1
OD_AXIS: 125

## 2024-03-25 ASSESSMENT — LID EXAM ASSESSMENTS
OD_BLEPHARITIS: RLL RUL
OS_BLEPHARITIS: LLL LUL

## 2024-03-25 ASSESSMENT — SPHEQUIV_DERIVED
OD_SPHEQUIV: -2.25
OS_SPHEQUIV: -2

## 2024-03-26 ENCOUNTER — APPOINTMENT (OUTPATIENT)
Dept: ORTHOPEDIC SURGERY | Facility: CLINIC | Age: 79
End: 2024-03-26
Payer: MEDICARE

## 2024-03-26 DIAGNOSIS — R20.0 ANESTHESIA OF SKIN: ICD-10-CM

## 2024-03-26 DIAGNOSIS — M75.90 BURSITIS OF UNSPECIFIED SHOULDER: ICD-10-CM

## 2024-03-26 DIAGNOSIS — M75.50 BURSITIS OF UNSPECIFIED SHOULDER: ICD-10-CM

## 2024-03-26 DIAGNOSIS — G89.29 PAIN IN RIGHT SHOULDER: ICD-10-CM

## 2024-03-26 DIAGNOSIS — M25.511 PAIN IN RIGHT SHOULDER: ICD-10-CM

## 2024-03-26 PROCEDURE — 20610 DRAIN/INJ JOINT/BURSA W/O US: CPT | Mod: 50

## 2024-03-26 PROCEDURE — 99214 OFFICE O/P EST MOD 30 MIN: CPT | Mod: 25

## 2024-03-26 PROCEDURE — 73030 X-RAY EXAM OF SHOULDER: CPT | Mod: 50

## 2024-03-26 NOTE — ASSESSMENT
[FreeTextEntry1] : ASSESSMENT: [The patient was accompanied today and was assisted with explaining their complaints today.] The patient comes in today with chronic exacerbated findings consistent with recurring worsening shoulder tendinopathy arthropathy bursitis weakness.  She has trialed injections and physical therapy and at this point she would like to repeat injections as she is going to [facility].   The patient was adequately and thoroughly informed of my assessment of their current condition(s).  - This may diminish bodily function for the extremity. We discussed prognosis, tx modalities including operative and nonoperative options for the above diagnostic assessment. As always, 2nd opinion is always provided as an option.  When accessible, I was able to review other physicians note(s) including reviewing other tests, imaging results as well as personally view these results for my own interpretation.   Bilateral SUBACROMIAL SHOULDER INJECTION   Indication:  subacromial bursitis/impingement, pain   Risk, benefits and alternatives were discussed with the patient. Potential complications include bleeding and infection. Alcohol was used to prep the area.  Ethyl chloride spray was used as a topical anesthetic.  Using sterile technique, the injection needle was then directed from a standard posterior approach parallel to and inferior to the acromion. A 21g needle was used to inject 5 mL of 1% Lidocaine and 1 unit 10mg Kenalog.  No significant resistance was encountered.  A bandage was applied.  The patient tolerated the procedure well.    Patient instructed to avoid strenuous activity for 2 day(s). Specifically counseled regarding the signs and symptoms of potential infection and instructed to present promptly to clinic or hospital if such signs and symptoms arise. The patient was adequately and thoroughly informed of my assessment of their current condition(s).  DISCUSSION: 1.  Bilateral shoulder injection.  HEP.  Failed PT 2.  Activity modification for bilateral carpal tunnel 3.  She is traveling to [place]

## 2024-03-26 NOTE — HISTORY OF PRESENT ILLNESS
[FreeTextEntry1] : Marce returns for follow-up today with her daughter.  At this point in time she is complaining of worsening left greater than right hand numbness and tingling with discomfort at the wrists.  She is considering surgical management for this condition She also presents with chronic bilateral shoulder discomfort atraumatic.  She has trialed activity modification physical therapy and prior injections.  Symptoms recur

## 2024-03-26 NOTE — PHYSICAL EXAM
[de-identified] : Examination of the bilateral shoulder reveals equaling passive and active motion as well as compared to the contralateral side She has positive Hart testing on the left.  She has pain and weakness with Ratna and Speed test with tenderness at the bicipital groove.  Examination of bilateral wrist reveals significant tenderness with carpal tunnel compression bilaterally eliciting numbness and tingling throughout the median nerve distribution.  She has loss of light touch bilaterally at the fingertips   [de-identified] : [4] views of [bilateral] shoulder were obtained today in my office and were seen by me and discussed with the patient.  These show findings consistent with shoulder arthropathy bilateral

## 2025-02-12 ENCOUNTER — NON-APPOINTMENT (OUTPATIENT)
Age: 80
End: 2025-02-12

## 2025-02-17 ENCOUNTER — OFFICE (OUTPATIENT)
Dept: URBAN - METROPOLITAN AREA CLINIC 115 | Facility: CLINIC | Age: 80
Setting detail: OPHTHALMOLOGY
End: 2025-02-17
Payer: MEDICAID

## 2025-02-17 DIAGNOSIS — H43.811: ICD-10-CM

## 2025-02-17 DIAGNOSIS — H11.041: ICD-10-CM

## 2025-02-17 DIAGNOSIS — H25.12: ICD-10-CM

## 2025-02-17 PROBLEM — Z96.1 PSEUDOPHAKIA ; RIGHT EYE: Status: ACTIVE | Noted: 2025-02-17

## 2025-02-17 PROCEDURE — 92014 COMPRE OPH EXAM EST PT 1/>: CPT | Performed by: OPHTHALMOLOGY

## 2025-02-17 PROCEDURE — 92136 OPHTHALMIC BIOMETRY: CPT | Mod: TC | Performed by: OPHTHALMOLOGY

## 2025-02-17 PROCEDURE — 92136 OPHTHALMIC BIOMETRY: CPT | Mod: 26,LT | Performed by: OPHTHALMOLOGY

## 2025-02-17 ASSESSMENT — KERATOMETRY
OS_K1K2_AVERAGE: 44.125
OS_AXISANGLE_DEGREES: 057
OS_CYLPOWER_DEGREES: 1.25
OS_K2POWER_DIOPTERS: 44.75
OD_CYLAXISANGLE_DEGREES: 177
OD_K2POWER_DIOPTERS: 44.00
OD_AXISANGLE2_DEGREES: 177
OS_K2POWER_DIOPTERS: 44.75
OD_AXISANGLE_DEGREES: 177
OS_AXISANGLE2_DEGREES: 057
METHOD_AUTO_MANUAL: AUTO
OS_K1POWER_DIOPTERS: 43.50
OD_K2POWER_DIOPTERS: 44.00
OD_K1POWER_DIOPTERS: 43.25
OS_CYLAXISANGLE_DEGREES: 57
OD_K1POWER_DIOPTERS: 43.25
OD_AXISANGLE_DEGREES: 177
OS_K1POWER_DIOPTERS: 43.50
OS_AXISANGLE_DEGREES: 057
OD_K1K2_AVERAGE: 43.625
OD_CYLPOWER_DEGREES: 0.75

## 2025-02-17 ASSESSMENT — TONOMETRY
OS_IOP_MMHG: 20
OD_IOP_MMHG: 16

## 2025-02-17 ASSESSMENT — REFRACTION_MANIFEST
OD_VA1: 20/30+2
OD_CYLINDER: -1.00
OS_VA1: 20/80-
OD_AXIS: 125
OS_SPHERE: -1.50
OS_CYLINDER: -1.75
OS_AXIS: 105
OD_SPHERE: -1.00

## 2025-02-17 ASSESSMENT — LID EXAM ASSESSMENTS
OS_BLEPHARITIS: LLL LUL
OD_BLEPHARITIS: RLL RUL

## 2025-02-17 ASSESSMENT — REFRACTION_AUTOREFRACTION
OS_CYLINDER: -2.00
OD_AXIS: 134
OS_AXIS: 095
OS_SPHERE: -1.75
OD_SPHERE: -1.75
OD_CYLINDER: -1.50

## 2025-02-17 ASSESSMENT — CONFRONTATIONAL VISUAL FIELD TEST (CVF)
OS_FINDINGS: FULL
OD_FINDINGS: FULL

## 2025-02-17 ASSESSMENT — VISUAL ACUITY
OD_BCVA: 20/100-1
OS_BCVA: 20/30-1

## 2025-02-17 ASSESSMENT — CORNEAL PTERYGIUM: OD_PTERYGIUM: NASAL 3MM

## 2025-02-20 ENCOUNTER — APPOINTMENT (OUTPATIENT)
Dept: ORTHOPEDIC SURGERY | Facility: CLINIC | Age: 80
End: 2025-02-20
Payer: MEDICARE

## 2025-02-20 VITALS
HEIGHT: 56 IN | DIASTOLIC BLOOD PRESSURE: 82 MMHG | BODY MASS INDEX: 31.27 KG/M2 | HEART RATE: 106 BPM | WEIGHT: 139 LBS | SYSTOLIC BLOOD PRESSURE: 122 MMHG

## 2025-02-20 DIAGNOSIS — M47.816 SPONDYLOSIS W/OUT MYELOPATHY OR RADICULOPATHY, LUMBAR REGION: ICD-10-CM

## 2025-02-20 DIAGNOSIS — S32.040D WEDGE COMPRESSION FRACTURE OF FOURTH LUMBAR VERTEBRA, SUBSEQUENT ENCOUNTER FOR FRACTURE WITH ROUTINE HEALING: ICD-10-CM

## 2025-02-20 PROCEDURE — 72110 X-RAY EXAM L-2 SPINE 4/>VWS: CPT

## 2025-02-20 PROCEDURE — 99214 OFFICE O/P EST MOD 30 MIN: CPT

## 2025-02-20 RX ORDER — SACUBITRIL AND VALSARTAN 24; 26 MG/1; MG/1
24-26 TABLET, FILM COATED ORAL
Refills: 0 | Status: ACTIVE | COMMUNITY

## 2025-02-20 RX ORDER — HYDROCHLOROTHIAZIDE 12.5 MG/1
TABLET ORAL
Refills: 0 | Status: ACTIVE | COMMUNITY

## 2025-02-20 RX ORDER — CLOPIDOGREL BISULFATE 75 MG/1
75 TABLET, FILM COATED ORAL
Refills: 0 | Status: ACTIVE | COMMUNITY

## 2025-02-20 RX ORDER — CARVEDILOL 25 MG/1
25 TABLET, FILM COATED ORAL
Refills: 0 | Status: ACTIVE | COMMUNITY

## 2025-02-20 RX ORDER — ROSUVASTATIN CALCIUM 10 MG/1
10 TABLET, FILM COATED ORAL
Refills: 0 | Status: ACTIVE | COMMUNITY

## 2025-02-20 RX ORDER — OMEPRAZOLE 20 MG/1
20 CAPSULE, DELAYED RELEASE ORAL
Refills: 0 | Status: ACTIVE | COMMUNITY

## 2025-02-20 RX ORDER — MELOXICAM 15 MG/1
15 TABLET ORAL DAILY
Qty: 30 | Refills: 1 | Status: ACTIVE | COMMUNITY
Start: 2025-02-20 | End: 1900-01-01

## 2025-02-20 RX ORDER — AMLODIPINE BESYLATE 5 MG/1
TABLET ORAL
Refills: 0 | Status: ACTIVE | COMMUNITY

## 2025-03-05 ASSESSMENT — KERATOMETRY
OS_K1POWER_DIOPTERS: 43.50
OD_K2POWER_DIOPTERS: 44.00
OS_K2POWER_DIOPTERS: 44.75
OD_K1POWER_DIOPTERS: 43.25
OD_AXISANGLE_DEGREES: 177
OS_AXISANGLE_DEGREES: 057
METHOD_AUTO_MANUAL: AUTO

## 2025-03-05 ASSESSMENT — REFRACTION_MANIFEST
OS_CYLINDER: -1.75
OS_SPHERE: -1.50
OD_VA1: 20/30+2
OS_AXIS: 105
OD_SPHERE: -1.00
OD_CYLINDER: -1.00
OD_AXIS: 125
OS_VA1: 20/80-

## 2025-03-14 ENCOUNTER — APPOINTMENT (OUTPATIENT)
Dept: ORTHOPEDIC SURGERY | Facility: CLINIC | Age: 80
End: 2025-03-14

## 2025-04-15 ENCOUNTER — RX RENEWAL (OUTPATIENT)
Age: 80
End: 2025-04-15

## 2025-06-20 ENCOUNTER — RX RENEWAL (OUTPATIENT)
Age: 80
End: 2025-06-20

## 2025-06-27 ENCOUNTER — APPOINTMENT (OUTPATIENT)
Dept: ORTHOPEDIC SURGERY | Facility: CLINIC | Age: 80
End: 2025-06-27

## 2025-07-24 ENCOUNTER — ASC (OUTPATIENT)
Dept: URBAN - METROPOLITAN AREA SURGERY 8 | Facility: SURGERY | Age: 80
Setting detail: OPHTHALMOLOGY
End: 2025-07-24
Payer: MEDICAID

## 2025-07-24 DIAGNOSIS — H25.12: ICD-10-CM

## 2025-07-24 PROCEDURE — 66984 XCAPSL CTRC RMVL W/O ECP: CPT | Mod: LT | Performed by: OPHTHALMOLOGY

## 2025-07-25 ENCOUNTER — OFFICE (OUTPATIENT)
Dept: URBAN - METROPOLITAN AREA CLINIC 115 | Facility: CLINIC | Age: 80
Setting detail: OPHTHALMOLOGY
End: 2025-07-25
Payer: MEDICAID

## 2025-07-25 DIAGNOSIS — Z96.1: ICD-10-CM

## 2025-07-25 PROCEDURE — 99024 POSTOP FOLLOW-UP VISIT: CPT | Performed by: OPHTHALMOLOGY

## 2025-07-25 ASSESSMENT — REFRACTION_MANIFEST
OD_VA1: 20/30+2
OD_AXIS: 125
OD_CYLINDER: -1.00
OD_SPHERE: -1.00
OS_CYLINDER: -1.75
OS_AXIS: 105
OS_VA1: 20/80-
OS_SPHERE: -1.50

## 2025-07-25 ASSESSMENT — VISUAL ACUITY
OD_BCVA: 20/200
OS_BCVA: 20/40-1

## 2025-07-25 ASSESSMENT — KERATOMETRY
OS_K1POWER_DIOPTERS: 43.50
OD_K2POWER_DIOPTERS: 44.00
OS_AXISANGLE_DEGREES: 057
METHOD_AUTO_MANUAL: AUTO
OS_K2POWER_DIOPTERS: 44.75
OD_K1POWER_DIOPTERS: 43.25
OD_AXISANGLE_DEGREES: 177

## 2025-07-25 ASSESSMENT — REFRACTION_AUTOREFRACTION
OS_AXIS: 095
OD_CYLINDER: -1.50
OD_AXIS: 134
OD_SPHERE: -1.75
OS_SPHERE: -1.75
OS_CYLINDER: -2.00

## 2025-07-25 ASSESSMENT — CONFRONTATIONAL VISUAL FIELD TEST (CVF)
OD_FINDINGS: FULL
OS_FINDINGS: FULL

## 2025-07-25 ASSESSMENT — CORNEAL PTERYGIUM: OD_PTERYGIUM: NASAL 3MM

## 2025-07-25 ASSESSMENT — LID EXAM ASSESSMENTS
OS_BLEPHARITIS: LLL LUL
OD_BLEPHARITIS: RLL RUL

## 2025-08-01 ENCOUNTER — OFFICE (OUTPATIENT)
Dept: URBAN - METROPOLITAN AREA CLINIC 115 | Facility: CLINIC | Age: 80
Setting detail: OPHTHALMOLOGY
End: 2025-08-01
Payer: MEDICAID

## 2025-08-01 DIAGNOSIS — Z96.1: ICD-10-CM

## 2025-08-01 PROCEDURE — 99024 POSTOP FOLLOW-UP VISIT: CPT | Performed by: OPHTHALMOLOGY

## 2025-08-01 ASSESSMENT — LID EXAM ASSESSMENTS
OS_BLEPHARITIS: LLL LUL
OD_BLEPHARITIS: RLL RUL

## 2025-08-01 ASSESSMENT — REFRACTION_MANIFEST
OD_CYLINDER: -1.00
OS_CYLINDER: -1.75
OD_VA1: 20/30+2
OS_AXIS: 105
OD_SPHERE: -1.00
OS_SPHERE: -1.50
OD_AXIS: 125
OS_VA1: 20/80-

## 2025-08-01 ASSESSMENT — CONFRONTATIONAL VISUAL FIELD TEST (CVF)
OS_FINDINGS: FULL
OD_FINDINGS: FULL

## 2025-08-01 ASSESSMENT — KERATOMETRY
OD_K2POWER_DIOPTERS: 44.00
OS_K2POWER_DIOPTERS: 44.75
OD_K1POWER_DIOPTERS: 43.25
METHOD_AUTO_MANUAL: AUTO
OS_K1POWER_DIOPTERS: 43.50
OD_AXISANGLE_DEGREES: 177
OS_AXISANGLE_DEGREES: 057

## 2025-08-01 ASSESSMENT — REFRACTION_AUTOREFRACTION
OD_AXIS: 127
OD_CYLINDER: -0.50
OS_AXIS: 128
OS_SPHERE: -0.25
OD_SPHERE: -2.25
OS_CYLINDER: -1.25

## 2025-08-01 ASSESSMENT — TONOMETRY
OD_IOP_MMHG: 18
OS_IOP_MMHG: 18

## 2025-08-01 ASSESSMENT — CORNEAL PTERYGIUM: OD_PTERYGIUM: NASAL 3MM

## 2025-08-01 ASSESSMENT — VISUAL ACUITY
OS_BCVA: 20/40-1
OD_BCVA: 20/40+2

## 2025-08-01 ASSESSMENT — CORNEAL EDEMA - FOLDS/STRIAE: OS_FOLDSSTRIAE: T

## 2025-08-15 ENCOUNTER — RX RENEWAL (OUTPATIENT)
Age: 80
End: 2025-08-15

## 2025-08-18 ENCOUNTER — OFFICE (OUTPATIENT)
Dept: URBAN - METROPOLITAN AREA CLINIC 115 | Facility: CLINIC | Age: 80
Setting detail: OPHTHALMOLOGY
End: 2025-08-18
Payer: MEDICAID

## 2025-08-18 DIAGNOSIS — Z96.1: ICD-10-CM

## 2025-08-18 PROCEDURE — 99024 POSTOP FOLLOW-UP VISIT: CPT | Performed by: OPHTHALMOLOGY

## 2025-08-18 ASSESSMENT — CORNEAL PTERYGIUM: OD_PTERYGIUM: NASAL 3MM

## 2025-08-18 ASSESSMENT — TONOMETRY
OS_IOP_MMHG: 17
OD_IOP_MMHG: 18

## 2025-08-18 ASSESSMENT — KERATOMETRY
OD_AXISANGLE_DEGREES: 177
OS_K2POWER_DIOPTERS: 44.75
OS_K1POWER_DIOPTERS: 43.50
OS_AXISANGLE_DEGREES: 057
OD_K2POWER_DIOPTERS: 44.00
OD_K1POWER_DIOPTERS: 43.25
METHOD_AUTO_MANUAL: AUTO

## 2025-08-18 ASSESSMENT — VISUAL ACUITY
OD_BCVA: 20/40-1
OS_BCVA: 20/30-2

## 2025-08-18 ASSESSMENT — REFRACTION_AUTOREFRACTION
OS_AXIS: 110
OS_CYLINDER: -1.50
OS_SPHERE: +0.50
OD_SPHERE: -2.25
OD_CYLINDER: -1.00
OD_AXIS: 142

## 2025-08-18 ASSESSMENT — REFRACTION_MANIFEST
OS_AXIS: 110
OS_CYLINDER: -1.75
OS_SPHERE: -1.50
OS_SPHERE: PLAN
OD_SPHERE: -1.50
OS_ADD: +2.50
OD_VA1: 20/30
OD_CYLINDER: -1.00
OD_AXIS: 125
OD_SPHERE: -1.00
OD_AXIS: 140
OD_CYLINDER: -1.00
OS_VA1: 20/80-
OS_CYLINDER: -1.50
OS_VA1: 20/30
OD_VA1: 20/30+2
OS_AXIS: 105
OD_ADD: +2.50

## 2025-08-18 ASSESSMENT — CONFRONTATIONAL VISUAL FIELD TEST (CVF)
OD_FINDINGS: FULL
OS_FINDINGS: FULL

## 2025-08-18 ASSESSMENT — LID EXAM ASSESSMENTS
OS_BLEPHARITIS: LLL LUL
OD_BLEPHARITIS: RLL RUL